# Patient Record
Sex: FEMALE | Race: WHITE
[De-identification: names, ages, dates, MRNs, and addresses within clinical notes are randomized per-mention and may not be internally consistent; named-entity substitution may affect disease eponyms.]

---

## 2018-03-31 ENCOUNTER — HOSPITAL ENCOUNTER (EMERGENCY)
Dept: HOSPITAL 17 - NEPD | Age: 53
LOS: 2 days | Discharge: HOME | End: 2018-04-02
Payer: COMMERCIAL

## 2018-03-31 VITALS
TEMPERATURE: 98.3 F | OXYGEN SATURATION: 98 % | DIASTOLIC BLOOD PRESSURE: 96 MMHG | RESPIRATION RATE: 18 BRPM | HEART RATE: 89 BPM | SYSTOLIC BLOOD PRESSURE: 167 MMHG

## 2018-03-31 VITALS — WEIGHT: 187.39 LBS | BODY MASS INDEX: 33.2 KG/M2 | HEIGHT: 63 IN

## 2018-03-31 DIAGNOSIS — G62.9: ICD-10-CM

## 2018-03-31 DIAGNOSIS — M79.7: ICD-10-CM

## 2018-03-31 DIAGNOSIS — I10: ICD-10-CM
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
## 2018-03-31 DIAGNOSIS — Z72.0: ICD-10-CM

## 2018-03-31 DIAGNOSIS — F10.14: Primary | ICD-10-CM

## 2018-03-31 DIAGNOSIS — F31.9: ICD-10-CM

## 2018-03-31 DIAGNOSIS — R45.851: ICD-10-CM

## 2018-03-31 DIAGNOSIS — Z91.5: ICD-10-CM

## 2018-03-31 LAB
ALBUMIN SERPL-MCNC: 4.3 GM/DL (ref 3.4–5)
ALP SERPL-CCNC: 62 U/L (ref 45–117)
ALT SERPL-CCNC: 24 U/L (ref 10–53)
APAP SERPL-MCNC: (no result) MCG/ML (ref 10–30)
AST SERPL-CCNC: 25 U/L (ref 15–37)
BASOPHILS # BLD AUTO: 0.1 TH/MM3 (ref 0–0.2)
BASOPHILS NFR BLD: 1.3 % (ref 0–2)
BILIRUB SERPL-MCNC: 0.4 MG/DL (ref 0.2–1)
BUN SERPL-MCNC: 14 MG/DL (ref 7–18)
CALCIUM SERPL-MCNC: 9 MG/DL (ref 8.5–10.1)
CHLORIDE SERPL-SCNC: 109 MEQ/L (ref 98–107)
CREAT SERPL-MCNC: 0.85 MG/DL (ref 0.5–1)
EOSINOPHIL # BLD: 0.2 TH/MM3 (ref 0–0.4)
EOSINOPHIL NFR BLD: 2.7 % (ref 0–4)
ERYTHROCYTE [DISTWIDTH] IN BLOOD BY AUTOMATED COUNT: 13 % (ref 11.6–17.2)
GFR SERPLBLD BASED ON 1.73 SQ M-ARVRAT: 71 ML/MIN (ref 89–?)
GLUCOSE SERPL-MCNC: 93 MG/DL (ref 74–106)
HCO3 BLD-SCNC: 19.5 MEQ/L (ref 21–32)
HCT VFR BLD CALC: 43.8 % (ref 35–46)
HGB BLD-MCNC: 15 GM/DL (ref 11.6–15.3)
LYMPHOCYTES # BLD AUTO: 3 TH/MM3 (ref 1–4.8)
LYMPHOCYTES NFR BLD AUTO: 46.5 % (ref 9–44)
MCH RBC QN AUTO: 32.3 PG (ref 27–34)
MCHC RBC AUTO-ENTMCNC: 34.2 % (ref 32–36)
MCV RBC AUTO: 94.4 FL (ref 80–100)
MONOCYTE #: 0.3 TH/MM3 (ref 0–0.9)
MONOCYTES NFR BLD: 5.1 % (ref 0–8)
NEUTROPHILS # BLD AUTO: 2.9 TH/MM3 (ref 1.8–7.7)
NEUTROPHILS NFR BLD AUTO: 44.4 % (ref 16–70)
PLATELET # BLD: 270 TH/MM3 (ref 150–450)
PMV BLD AUTO: 9.9 FL (ref 7–11)
PROT SERPL-MCNC: 8.3 GM/DL (ref 6.4–8.2)
RBC # BLD AUTO: 4.63 MIL/MM3 (ref 4–5.3)
SODIUM SERPL-SCNC: 140 MEQ/L (ref 136–145)
WBC # BLD AUTO: 6.5 TH/MM3 (ref 4–11)

## 2018-03-31 PROCEDURE — 99284 EMERGENCY DEPT VISIT MOD MDM: CPT

## 2018-03-31 PROCEDURE — 80307 DRUG TEST PRSMV CHEM ANLYZR: CPT

## 2018-03-31 PROCEDURE — 85025 COMPLETE CBC W/AUTO DIFF WBC: CPT

## 2018-03-31 PROCEDURE — 80053 COMPREHEN METABOLIC PANEL: CPT

## 2018-03-31 PROCEDURE — 80178 ASSAY OF LITHIUM: CPT

## 2018-03-31 PROCEDURE — 84443 ASSAY THYROID STIM HORMONE: CPT

## 2018-03-31 NOTE — PD
HPI


Chief Complaint:  Psychiatric Symptoms


Time Seen by Provider:  21:04


Travel History


International Travel<30 days:  No


Contact w/Intl Traveler<30days:  No


Traveled to known affect area:  No





History of Present Illness


HPI


52-year-old white female presents emergency department under a Baker act by PD.

  Patient has history of bipolar disorder, chronic neck and back pain, 

neuropathy and hypertension.  Patient states that she has been feeling 

increasingly depressed.  She is in the process of being weaned off her Xanax 

and lithium and she is starting Seroquel.  This is been going on for the past 

month.  She states that she has had thoughts of self-harm.  She also cuts.  She 

last cut earlier this week to her left lower leg.  She states that she feels 

suicidal but has no current plan.  She would like help.  She called police 

herself.  She denies any self-harm today.  No homicidal ideation.  She did not 

take her evening dose of blood pressure medicine.  She typically takes 100 mg 

of atenolol twice daily.





PFSH


Past Medical History


Bipolar Disorder:  Yes


Anxiety:  Yes


Depression:  Yes


Hypertension:  Yes


Medical other:  Yes (HIGH CALCIUM AND LOW VITAMIN D)


Neurologic:  Yes (NEUROPATHY FROM CHRONIC NECK AND BACK PAIN)


Tetanus Vaccination:  < 5 Years


Influenza Vaccination:  No


Pregnant?:  Not Pregnant


Menopausal:  Yes


Dilation and Curettage (D&C):  Yes (X2)


Tubal Ligation:  Yes





Past Surgical History


 Section:  Yes


Gynecologic Surgery:  Yes (RIGHT LUMPECTOMY X2 WITH DUCT REMOVAL, LAP RIGHT 

OVARIAN CYST REMOVAL)





Social History


Alcohol Use:  Yes (DAILY)


Tobacco Use:  Yes


Substance Use:  No





Allergies-Medications


(Allergen,Severity, Reaction):  


Coded Allergies:  


     dexamethasone (Unverified  Allergy, Severe, THROAT CLOSES UP, 17)


Reported Meds & Prescriptions





Reported Meds & Active Scripts


Active


Reported


Gabapentin 800 Mg Tab 800 Mg PO DAILY


Norco (Hydrocodone-Acetaminophen)  Mg Tab 1 Tab PO TID PRN


Lithium Carbonate 300 Mg Cap 300 Mg PO HS


Seroquel (Quetiapine Fumarate) 25 Mg Tab 25 Mg PO HS


Atenolol 100 Mg Tab 100 Mg PO BID








Review of Systems


Except as stated in HPI:  all other systems reviewed are Neg


General / Constitutional:  No: Fever


Eyes:  No: Visual changes


HENT:  No: Headaches


Cardiovascular:  No: Chest Pain or Discomfort


Respiratory:  No: Shortness of Breath


Gastrointestinal:  No: Abdominal Pain


Genitourinary:  No: Dysuria


Musculoskeletal:  No: Pain


Skin:  No Rash


Neurologic:  No: Weakness


Psychiatric:  Positive: Depression, Suicidal Ideations, Mood Disorder, No: 

Anxiety, Disorder of Thought, Substance Abuse, Homicidal Ideation


Endocrine:  No: Polydipsia


Hematologic/Lymphatic:  No: Easy Bruising





Physical Exam


Narrative


GENERAL: Well-nourished, well-developed patient.


SKIN: Warm and dry.  Patient has healing superficial cuts to her left proximal 

inner calf.  No signs of infection.  This is in the stages of healing.


HEAD: Normocephalic and atraumatic.


EYES: No scleral icterus. No injection or drainage. 


ENT: No nasal drainage noted. Mucous membranes pink. Airway patent.


NECK: Supple, trachea midline.  Moves head freely without obvious discomfort.


CARDIOVASCULAR: Regular rate and rhythm without murmurs, gallops, or rubs. 


RESPIRATORY: Breath sounds equal bilaterally. No accessory muscle use.


GASTROINTESTINAL: Abdomen soft, non-tender, nondistended. 


EXTREMITIES: No cyanosis or edema.


BACK: Nontender without obvious deformity. No CVA tenderness.


NEURO: Patient is alert and oriented. no sensorimotor deficits.  Nonfocal.  

Normal speech.


PSYCH: No delusions.  No auditory or visual hallucinations.





Data


Data


Last Documented VS





Vital Signs








  Date Time  Temp Pulse Resp B/P (MAP) Pulse Ox O2 Delivery O2 Flow Rate FiO2


 


3/31/18 20:53 98.3 89 18 167/96 (119) 98   








Orders





 Orders


Complete Blood Count With Diff (3/31/18 21:13)


Comprehensive Metabolic Panel (3/31/18 21:13)


Thyroid Stimulating Hormone (3/31/18 21:13)


Psych Screen (3/31/18 21:13)


Lithium (Li) (3/31/18 21:13)


Drug Screen, Random Urine (3/31/18 21:13)


Alcohol (Ethanol) (3/31/18 21:13)


Salicylates (Aspirin) (3/31/18 21:13)


Tylenol (Acetaminophen) (3/31/18 21:13)


Atenolol (Tenormin) (3/31/18 21:15)





Labs





Laboratory Tests








Test


  3/31/18


21:20


 


White Blood Count 6.5 TH/MM3 


 


Red Blood Count 4.63 MIL/MM3 


 


Hemoglobin 15.0 GM/DL 


 


Hematocrit 43.8 % 


 


Mean Corpuscular Volume 94.4 FL 


 


Mean Corpuscular Hemoglobin 32.3 PG 


 


Mean Corpuscular Hemoglobin


Concent 34.2 % 


 


 


Red Cell Distribution Width 13.0 % 


 


Platelet Count 270 TH/MM3 


 


Mean Platelet Volume 9.9 FL 


 


Neutrophils (%) (Auto) 44.4 % 


 


Lymphocytes (%) (Auto) 46.5 % 


 


Monocytes (%) (Auto) 5.1 % 


 


Eosinophils (%) (Auto) 2.7 % 


 


Basophils (%) (Auto) 1.3 % 


 


Neutrophils # (Auto) 2.9 TH/MM3 


 


Lymphocytes # (Auto) 3.0 TH/MM3 


 


Monocytes # (Auto) 0.3 TH/MM3 


 


Eosinophils # (Auto) 0.2 TH/MM3 


 


Basophils # (Auto) 0.1 TH/MM3 


 


CBC Comment DIFF FINAL 


 


Differential Comment  


 


Blood Urea Nitrogen 14 MG/DL 


 


Creatinine 0.85 MG/DL 


 


Random Glucose 93 MG/DL 


 


Total Protein 8.3 GM/DL 


 


Albumin 4.3 GM/DL 


 


Calcium Level 9.0 MG/DL 


 


Alkaline Phosphatase 62 U/L 


 


Aspartate Amino Transf


(AST/SGOT) 25 U/L 


 


 


Alanine Aminotransferase


(ALT/SGPT) 24 U/L 


 


 


Total Bilirubin 0.4 MG/DL 


 


Sodium Level 140 MEQ/L 


 


Potassium Level 3.9 MEQ/L 


 


Chloride Level 109 MEQ/L 


 


Carbon Dioxide Level 19.5 MEQ/L 


 


Anion Gap 12 MEQ/L 


 


Estimat Glomerular Filtration


Rate 71 ML/MIN 


 


 


Thyroid Stimulating Hormone


3rd Gen 1.080 uIU/ML 


 


 


Salicylates Level 5.0 MG/DL 


 


Urine Opiates Screen NEG 


 


Acetaminophen Level


  LESS THAN 2.0


MCG/ML


 


Urine Barbiturates Screen NEG 


 


Urine Amphetamines Screen NEG 


 


Urine Benzodiazepines Screen NEG 


 


Lithium Level 0.2 MEQ/L 


 


Urine Cocaine Screen NEG 


 


Urine Cannabinoids Screen NEG 


 


Ethyl Alcohol Level 216 MG/DL 











MDM


Medical Decision Making


Medical Screen Exam Complete:  Yes


Emergency Medical Condition:  Yes


Medical Record Reviewed:  Yes


Interpretation(s)





Laboratory Tests








Test


  3/31/18


21:20


 


White Blood Count 6.5 TH/MM3 


 


Red Blood Count 4.63 MIL/MM3 


 


Hemoglobin 15.0 GM/DL 


 


Hematocrit 43.8 % 


 


Mean Corpuscular Volume 94.4 FL 


 


Mean Corpuscular Hemoglobin 32.3 PG 


 


Mean Corpuscular Hemoglobin


Concent 34.2 % 


 


 


Red Cell Distribution Width 13.0 % 


 


Platelet Count 270 TH/MM3 


 


Mean Platelet Volume 9.9 FL 


 


Neutrophils (%) (Auto) 44.4 % 


 


Lymphocytes (%) (Auto) 46.5 % 


 


Monocytes (%) (Auto) 5.1 % 


 


Eosinophils (%) (Auto) 2.7 % 


 


Basophils (%) (Auto) 1.3 % 


 


Neutrophils # (Auto) 2.9 TH/MM3 


 


Lymphocytes # (Auto) 3.0 TH/MM3 


 


Monocytes # (Auto) 0.3 TH/MM3 


 


Eosinophils # (Auto) 0.2 TH/MM3 


 


Basophils # (Auto) 0.1 TH/MM3 


 


CBC Comment DIFF FINAL 


 


Differential Comment  


 


Blood Urea Nitrogen 14 MG/DL 


 


Creatinine 0.85 MG/DL 


 


Random Glucose 93 MG/DL 


 


Total Protein 8.3 GM/DL 


 


Albumin 4.3 GM/DL 


 


Calcium Level 9.0 MG/DL 


 


Alkaline Phosphatase 62 U/L 


 


Aspartate Amino Transf


(AST/SGOT) 25 U/L 


 


 


Alanine Aminotransferase


(ALT/SGPT) 24 U/L 


 


 


Total Bilirubin 0.4 MG/DL 


 


Sodium Level 140 MEQ/L 


 


Potassium Level 3.9 MEQ/L 


 


Chloride Level 109 MEQ/L 


 


Carbon Dioxide Level 19.5 MEQ/L 


 


Anion Gap 12 MEQ/L 


 


Estimat Glomerular Filtration


Rate 71 ML/MIN 


 


 


Thyroid Stimulating Hormone


3rd Gen 1.080 uIU/ML 


 


 


Salicylates Level 5.0 MG/DL 


 


Urine Opiates Screen NEG 


 


Acetaminophen Level


  LESS THAN 2.0


MCG/ML


 


Urine Barbiturates Screen NEG 


 


Urine Amphetamines Screen NEG 


 


Urine Benzodiazepines Screen NEG 


 


Lithium Level 0.2 MEQ/L 


 


Urine Cocaine Screen NEG 


 


Urine Cannabinoids Screen NEG 


 


Ethyl Alcohol Level 216 MG/DL 








Differential Diagnosis


MDM: High


Differential diagnoses: Schizophrenia, schizoaffective disorder, bipolar, 

anxiety, depression, adjustment reaction, mood disorder NOS, ODD, depressive 

disorder NOS, dementia, dementia with agitation, psychosis NOS, substance 

induced mood disorder, DMDD, Asperger syndrome, infection,electrolyte 

abnormality, malingering.


Narrative Course


Mental health screening discussed with the patient.  Psychiatric screen 

ordered.  Patient is given her atenolol 100 mg p.o. here.





The patient is now requesting the rest of her eating medications.  She does 

have her bottles containing all of her medications.  The patient is allowed to 

take her evening medications.  This will be documented in the nursing note.





The patient has been medically cleared.





This is medical clearance for psychiatric admission, bipolar, cutting





Diagnosis





 Primary Impression:  


 Medical clearance for psychiatric admission


 Additional Impressions:  


 Bipolar


 Cutting


Condition:  Stable











Chetan Haywood Mar 31, 2018 21:20

## 2018-04-01 VITALS
HEART RATE: 80 BPM | DIASTOLIC BLOOD PRESSURE: 75 MMHG | RESPIRATION RATE: 16 BRPM | SYSTOLIC BLOOD PRESSURE: 125 MMHG | OXYGEN SATURATION: 97 %

## 2018-04-01 VITALS
OXYGEN SATURATION: 98 % | RESPIRATION RATE: 19 BRPM | SYSTOLIC BLOOD PRESSURE: 144 MMHG | HEART RATE: 66 BPM | DIASTOLIC BLOOD PRESSURE: 66 MMHG

## 2018-04-02 VITALS
DIASTOLIC BLOOD PRESSURE: 88 MMHG | OXYGEN SATURATION: 98 % | HEART RATE: 90 BPM | SYSTOLIC BLOOD PRESSURE: 162 MMHG | RESPIRATION RATE: 16 BRPM

## 2018-04-02 VITALS
DIASTOLIC BLOOD PRESSURE: 90 MMHG | HEART RATE: 83 BPM | TEMPERATURE: 98.5 F | OXYGEN SATURATION: 98 % | SYSTOLIC BLOOD PRESSURE: 171 MMHG | RESPIRATION RATE: 17 BRPM

## 2018-04-02 VITALS — DIASTOLIC BLOOD PRESSURE: 90 MMHG | SYSTOLIC BLOOD PRESSURE: 171 MMHG | TEMPERATURE: 98.5 F
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
## 2018-04-02 NOTE — PD
Physical Exam


Date Seen by Provider:  Apr 2, 2018


Time Seen by Provider:  07:49


Narrative


52-year-old female previously brought in under the Baker act and medically 

cleared for psychiatric evaluation, has been seen by the psychiatrist and 

deemed psychiatrically stable for discharge at this time.  Patient remains 

medically stable for discharge.  Follow-up will be based on the psychiatric 

note.





Data


Data


Last Documented VS





Vital Signs








  Date Time  Temp Pulse Resp B/P (MAP) Pulse Ox O2 Delivery O2 Flow Rate FiO2


 


4/2/18 02:47  90 16 162/88 (112) 98 Room Air  


 


3/31/18 20:53 98.3       








Orders





 Orders


Complete Blood Count With Diff (3/31/18 21:13)


Comprehensive Metabolic Panel (3/31/18 21:13)


Thyroid Stimulating Hormone (3/31/18 21:13)


Psych Screen (3/31/18 21:13)


Lithium (Li) (3/31/18 21:13)


Drug Screen, Random Urine (3/31/18 21:13)


Alcohol (Ethanol) (3/31/18 21:13)


Salicylates (Aspirin) (3/31/18 21:13)


Tylenol (Acetaminophen) (3/31/18 21:13)


Atenolol (Tenormin) (3/31/18 21:15)


Diet Regular Basic (4/1/18 Breakfast)


Atenolol (Tenormin) (4/2/18 03:00)


Diet Regular Basic (4/2/18 Breakfast)





Labs





Laboratory Tests








Test


  3/31/18


21:20


 


White Blood Count 6.5 TH/MM3 


 


Red Blood Count 4.63 MIL/MM3 


 


Hemoglobin 15.0 GM/DL 


 


Hematocrit 43.8 % 


 


Mean Corpuscular Volume 94.4 FL 


 


Mean Corpuscular Hemoglobin 32.3 PG 


 


Mean Corpuscular Hemoglobin


Concent 34.2 % 


 


 


Red Cell Distribution Width 13.0 % 


 


Platelet Count 270 TH/MM3 


 


Mean Platelet Volume 9.9 FL 


 


Neutrophils (%) (Auto) 44.4 % 


 


Lymphocytes (%) (Auto) 46.5 % 


 


Monocytes (%) (Auto) 5.1 % 


 


Eosinophils (%) (Auto) 2.7 % 


 


Basophils (%) (Auto) 1.3 % 


 


Neutrophils # (Auto) 2.9 TH/MM3 


 


Lymphocytes # (Auto) 3.0 TH/MM3 


 


Monocytes # (Auto) 0.3 TH/MM3 


 


Eosinophils # (Auto) 0.2 TH/MM3 


 


Basophils # (Auto) 0.1 TH/MM3 


 


CBC Comment DIFF FINAL 


 


Differential Comment  


 


Blood Urea Nitrogen 14 MG/DL 


 


Creatinine 0.85 MG/DL 


 


Random Glucose 93 MG/DL 


 


Total Protein 8.3 GM/DL 


 


Albumin 4.3 GM/DL 


 


Calcium Level 9.0 MG/DL 


 


Alkaline Phosphatase 62 U/L 


 


Aspartate Amino Transf


(AST/SGOT) 25 U/L 


 


 


Alanine Aminotransferase


(ALT/SGPT) 24 U/L 


 


 


Total Bilirubin 0.4 MG/DL 


 


Sodium Level 140 MEQ/L 


 


Potassium Level 3.9 MEQ/L 


 


Chloride Level 109 MEQ/L 


 


Carbon Dioxide Level 19.5 MEQ/L 


 


Anion Gap 12 MEQ/L 


 


Estimat Glomerular Filtration


Rate 71 ML/MIN 


 


 


Thyroid Stimulating Hormone


3rd Gen 1.080 uIU/ML 


 


 


Salicylates Level 5.0 MG/DL 


 


Urine Opiates Screen NEG 


 


Acetaminophen Level


  LESS THAN 2.0


MCG/ML


 


Urine Barbiturates Screen NEG 


 


Urine Amphetamines Screen NEG 


 


Urine Benzodiazepines Screen NEG 


 


Lithium Level 0.2 MEQ/L 


 


Urine Cocaine Screen NEG 


 


Urine Cannabinoids Screen NEG 


 


Ethyl Alcohol Level 216 MG/DL 











[facility]


Medical Record Reviewed:  Yes


Supervised Visit with LIZETT:  Yes


Narrative Course


52-year-old female previously brought in under the Baker act and medically 

cleared for psychiatric evaluation, has been seen by the psychiatrist and 

deemed psychiatrically stable for discharge at this time.  Patient remains 

medically stable for discharge.  Follow-up will be based on the psychiatric 

note.


Diagnosis





 Primary Impression:  


 Medical clearance for psychiatric admission


 Additional Impressions:  


 Bipolar


 Cutting


Referrals:  


Primary Care Physician





Psychiatrist


Patient Instructions:  General Instructions


Disposition:  01 DISCHARGE HOME


Condition:  Stable











Oneil Perez Apr 2, 2018 07:51

## 2018-06-19 ENCOUNTER — HOSPITAL ENCOUNTER (EMERGENCY)
Dept: HOSPITAL 17 - NEPJ | Age: 53
LOS: 1 days | Discharge: HOME | End: 2018-06-20
Payer: COMMERCIAL

## 2018-06-19 VITALS — DIASTOLIC BLOOD PRESSURE: 94 MMHG | HEART RATE: 82 BPM | SYSTOLIC BLOOD PRESSURE: 168 MMHG

## 2018-06-19 VITALS — WEIGHT: 185.19 LBS | BODY MASS INDEX: 32.81 KG/M2 | HEIGHT: 63 IN

## 2018-06-19 VITALS
TEMPERATURE: 97.8 F | SYSTOLIC BLOOD PRESSURE: 194 MMHG | HEART RATE: 72 BPM | DIASTOLIC BLOOD PRESSURE: 102 MMHG | RESPIRATION RATE: 20 BRPM | OXYGEN SATURATION: 98 %

## 2018-06-19 VITALS — SYSTOLIC BLOOD PRESSURE: 158 MMHG | RESPIRATION RATE: 12 BRPM | HEART RATE: 78 BPM | DIASTOLIC BLOOD PRESSURE: 90 MMHG

## 2018-06-19 DIAGNOSIS — F10.14: ICD-10-CM

## 2018-06-19 DIAGNOSIS — G62.9: ICD-10-CM

## 2018-06-19 DIAGNOSIS — Z88.8: ICD-10-CM

## 2018-06-19 DIAGNOSIS — Z72.0: ICD-10-CM

## 2018-06-19 DIAGNOSIS — M54.9: ICD-10-CM

## 2018-06-19 DIAGNOSIS — I10: ICD-10-CM

## 2018-06-19 DIAGNOSIS — Z79.899: ICD-10-CM

## 2018-06-19 DIAGNOSIS — F41.9: ICD-10-CM

## 2018-06-19 DIAGNOSIS — R51: ICD-10-CM

## 2018-06-19 DIAGNOSIS — R45.851: Primary | ICD-10-CM

## 2018-06-19 DIAGNOSIS — F31.9: ICD-10-CM

## 2018-06-19 PROCEDURE — 99284 EMERGENCY DEPT VISIT MOD MDM: CPT

## 2018-06-19 RX ADMIN — ATENOLOL SCH MG: 100 TABLET ORAL at 20:22

## 2018-06-19 NOTE — PD
HPI


Chief Complaint:  Psychiatric Symptoms


Time Seen by Provider:  18:12


Travel History


International Travel<30 days:  No


Contact w/Intl Traveler<30days:  No


Traveled to known affect area:  No





History of Present Illness


HPI


52-year-old female presents to the emergency department under Baker act.  She 

was sent here from [facility], after being medically cleared, 

for psychological evaluation.  According to medical records from [facility] the patient took 50, 100 mg atenolol in an attempt to kill herself on 

.  The patient admits to taking the medications.  She says she was drunk 

at the time and has been drinking daily, and her action was exacerbated by her 

alcohol use.  She denies suicidal ideation at this time.  She has history of 

suicidal attempt by overdosing on pills in the past.  She denies homicidal 

ideations.  Denies auditory or visual hallucinations.  Denies illicit drug use.

  Aggravated by alcohol use.  No known relieving factors.  Symptoms are 

moderate to severe in severity.  Duration chronic.  She is complaining of a 

headache that has been on and off.  She denies chest pain, shortness of breath, 

abdominal pain, nausea, vomiting, change in urine or stool.  She is complaining 

of left upper back pain also.  Denies anticoagulant therapy.  Primary care 

provider is Dr. Rhodes.  Allergies to dexamethasone.  History of bipolar 

disorder and hypertension.  Has no other medical complaints.  No other 

modifying factors or associated signs and symptoms.





PFSH


Past Medical History


Bipolar Disorder:  Yes


Anxiety:  Yes


Depression:  Yes


Patient Takes Glucophage:  No


Hypertension:  Yes


Neurologic:  Yes (NEUROPATHY FROM CHRONIC NECK AND BACK PAIN)


Reproductive:  Yes


Pregnant?:  Not Pregnant


Menopausal:  Yes


:  3


Para:  2


Miscarriage:  0


:  1


Dilation and Curettage (D&C):  Yes (X2)


Tubal Ligation:  Yes





Past Surgical History


 Section:  Yes


Gynecologic Surgery:  Yes (RIGHT LUMPECTOMY X2 WITH DUCT REMOVAL, LAP RIGHT 

OVARIAN CYST REMOVAL)





Social History


Alcohol Use:  Yes (DAILY)


Tobacco Use:  Yes


Substance Use:  No





Allergies-Medications


(Allergen,Severity, Reaction):  


Coded Allergies:  


     dexamethasone (Unverified  Allergy, Severe, THROAT CLOSES UP, 18)


 Per pt.


Reported Meds & Prescriptions





Reported Meds & Active Scripts


Active


Reported


Visine Opth Drops (Tetrahydrozoline Opth Drops) 0.05 % Sol 1 Drop EACH EYE QID


Omega 3-6-9 1,200 mg Softgel (Fish Oil/Borage/Flax/Om3,6,9#1) 1,200 Mg Capsule 

  


Lisinopril 20 Mg Tab 20 Mg PO DAILY


Clonidine (Clonidine HCl) 0.2 Mg Tab 0.2 Mg PO BID PRN


Vitamin D3 (Cholecalciferol) 400 Unit Tab 400 Units PO DAILY


Combivent Respimat Inh (Ipratropium-Albuterol Inh)  [facility]/Act Aero 1 Puff 

INH QID PRN


Gabapentin 800 Mg Tab 800 Mg PO DAILY


Seroquel (Quetiapine Fumarate) 25 Mg Tab 25 Mg PO HS


Atenolol 100 Mg Tab 100 Mg PO BID








Review of Systems


Except as stated in HPI:  all other systems reviewed are Neg





Physical Exam


Narrative


GENERAL: Well-nourished, well-developed  feet patient, in no acute 

distress


SKIN: Warm and dry.  Skin to left upper back is normal in appearance.


HEAD: Atraumatic. Normocephalic. 


EYES: Pupils equal and round.


ENT: Mucosa pink and moist.


NECK: Supple.  Trachea midline.  


CARDIOVASCULAR: Regular rate and rhythm.  No murmur appreciated. 


RESPIRATORY: No accessory muscle use. Clear to auscultation. Breath sounds 

equal bilaterally. 


GASTROINTESTINAL: Abdomen soft, non-tender, nondistended. Hepatic and splenic 

margins not palpable.  Bowel sounds are active 4 quadrants.  


MUSCULOSKELETAL: No obvious deformities. No clubbing.  No cyanosis.  No edema. 


BACK: No CVA tenderness.  Reproducible tenderness to the left upper trapezius 

musculature of the back.


NEUROLOGICAL: Awake and alert.  Oriented 3.  No obvious cranial nerve 

deficits.  Motor grossly within normal limits. Normal speech.  Moves all 

extremities.  5/5 strength to all extremities.


PSYCHIATRIC: No delusional thought processes. No hallucinations.





Data


Data


Orders





 Orders


Diet Regular Basic (18 Dinner)


Ibuprofen (Motrin) (18 18:15)








MDM


Medical Decision Making


Medical Screen Exam Complete:  Yes


Emergency Medical Condition:  Yes


Medical Record Reviewed:  Yes


Differential Diagnosis


Suicidal attempt, overdose, medical clearance for psychiatric admission


Narrative Course


Patient presents under a Smart act.  She is transferred from [facility] [place] after medical clearance.  I reviewed her medical records from [facility] and her labs, urinalysis unremarkable.  Physical examination and vital 

signs are essentially unremarkable.  Patient complaining of headache and left 

upper back pain.  Ibuprofen ordered.





Psych screen has been ordered.





laboratory results are unremarkable, the patient will be medically cleared for 

psychiatric evaluation and disposition.





Diagnosis





 Primary Impression:  


 Medical clearance for psychiatric admission


 Additional Impression:  


 Suicide attempt


Condition:  Stable











Ariana Fountain 2018 18:31

## 2018-06-20 VITALS
RESPIRATION RATE: 16 BRPM | OXYGEN SATURATION: 99 % | DIASTOLIC BLOOD PRESSURE: 98 MMHG | SYSTOLIC BLOOD PRESSURE: 168 MMHG | HEART RATE: 75 BPM

## 2018-06-20 VITALS — SYSTOLIC BLOOD PRESSURE: 172 MMHG | HEART RATE: 76 BPM | DIASTOLIC BLOOD PRESSURE: 96 MMHG

## 2018-06-20 RX ADMIN — ATENOLOL SCH MG: 100 TABLET ORAL at 09:00

## 2018-06-20 NOTE — PD.PSY.CON
Provisional Diagnosis


Admission Date





Axis I.


Polysubstance dependence including alcohol, cocaine, Xanax, history of bipolar 

disorder


Axis II.


Borderline personality disorder


Axis III.


Lower back pain





History of Present Illness


Service


Psychiatry


Consult Requested By


Suicidal ideation


Reason for Consult


Suicidal ideation


Primary Care Physician


Unknown


HPI


The patient was seen this morning at 8:30 AM





The patient is 52-year-old  woman, domiciled with her  in 

[place], unemployed, supported by [facility], with psychiatric history of bipolar 

disorder, polysubstance dependence including Xanax, cocaine, alcohol, opiates, 

with an extensive history of multiple psychiatric hospitalizations, self 

cutting behavior, multiple suicidal attempts, she is not in psychotropics, 

medical history of back pain, who presents to the emergency department under 

Baker act.  She was sent here from [facility] ER, after being 

medically cleared, for psychiatric evaluation.   According to medical records 

from [facility] the patient took 50, 100 mg atenolol in an attempt to 

kill herself on June 17.  The patient admits to taking the medications.  She 

says she was drunk at the time and has been drinking daily, and her action was 

exacerbated by her alcohol use.  She denies suicidal ideation at this time.  

She has history of suicidal attempt by overdosing on pills in the past.  She 

denies homicidal ideations.  Denies auditory or visual hallucinations.  Denies 

illicit drug use.  Aggravated by alcohol use.  No known relieving factors.  

Symptoms are moderate to severe in severity.  Duration  that has been on 

and off.  She denies chest pain, shortness of breath, abdominal pain, nausea, 

vomiting, change in urine or stool.  She is complaining of left upper back pain 

also.  Denies anticoagulant therapy.  Primary care provider is Dr. Rhodes.  

Allergies to dexamethasone.  On psychiatric evaluation today the patient is calm

, cooperative.  She reports that last night she was quite drunk when she took 

pills impulsively.  The patient reports that her intention was not to kill 

herself, she was just drunk and crazy.  The patient reports that she understand 

that she is a drug addict and she needs help, her major problem right now is 

the alcohol abuse.  She is open to look for help.  She denies suicidal and 

homicidal ideation, she denies visual and auditory hallucinations.  Denies 

depression, anxiety, symptoms of withdrawal, denies psychosis.  She is logical, 

coherent and relevant.  Oriented 3.





Review of Systems


Constitutional:  DENIES: Diaphoretic episodes, Fatigue, Fever, Weight gain, 

Weight loss, Chills, Dizziness, Change in appetite, Night Sweats


Endocrine:  DENIES: Abnorml menstrual pattern, Heat/cold intolerance, Polydipsia

, Polyuria, Polyphagia


Eyes:  DENIES: Blurred vision, Diplopia, Eye inflammation, Eye pain, Vision loss

, Photosensitivity, Double Vision


Ears, nose, mouth, throat:  DENIES: Tinnitus, Hearing loss, Vertigo, Nasal 

discharge, Oral lesions, Throat pain, Hoarseness, Ear Pain, Running Nose, 

Epistaxis, Sinus Pain, Toothache, Odynophagia


Respiratory:  DENIES: Apneas, Cough, Snoring, Wheezing, Hemoptysis, Sputum 

production, Shortness of breath


Cardiovascular:  DENIES: Chest pain, Palpitations, Syncope, Dyspnea on Exertion

, PND, Lower Extremity Edema, Orthopnea, Claudication


Gastrointestinal:  DENIES: Abdominal pain, Black stools, Bloody stools, 

Constipation, Diarrhea, Nausea, Vomiting, Difficulty Swallowing, Anorexia


Genitourinary:  DENIES: Abnormal vaginal bleeding, Dysmenorrhea, Dyspareunia, 

Sexual dysfunction, Urinary frequency, Urinary incontinence, Urgency, Hematuria

, Dysuria, Nocturia, Vaginal discharge


Musculoskeletal:  DENIES: Joint pain, Muscle aches, Stiffness, Joint Swelling, 

Back pain, Neck pain


Integumentary:  DENIES: Abnormal pigmentation, Pruritus, Rash, Nail changes, 

Breast masses, Breast skin changes, Nipple discharge


Hematologic/lymphatic:  DENIES: Bruising, Lymphadenopathy


Immunologic/allergic:  DENIES: Eczema, Urticaria


Neurologic:  DENIES: Abnormal gait, Headache, Localized weakness, Paresthesias, 

Seizures, Speech Problems, Tremor, Poor Balance


Psychiatric:  DENIES: Anxiety, Confusion, Mood changes, Depression, 

Hallucinations, Agitation, Suicidal Ideation, Homicidal Ideation, Delusions





Past Family Social History


Coded Allergies:  


     dexamethasone (Unverified  Allergy, Severe, THROAT CLOSES UP, 6/19/18)


 Per pt.


Reported Medications


Tetrahydrozoline Opth Drops (Visine Opth Drops) 0.05 % Sol, 1 DROP EACH EYE QID

, #1 BOTTLE 0 Refills


   6/19/18


Fish Oil/Borage/Flax/Om3,6,9#1 (Omega 3-6-9 1,200 mg Softgel) 1,200 Mg Capsule


   6/19/18


Lisinopril (Lisinopril) 20 Mg Tab, 20 MG PO DAILY, #30 TAB 0 Refills


   6/19/18


Clonidine (Clonidine) 0.2 Mg Tab, 0.2 MG PO BID Y for BP>160/80, #60 TAB 0 

Refills


   6/19/18


Cholecalciferol (Vitamin D3) 400 Unit Tab, 400 UNITS PO DAILY for Nutritional 

Supplement, #1 TAB 0 Refills


   6/19/18


Ipratropium-Albuterol Inh (Combivent Respimat Inh)  [facility]/Act Aero, 1 PUFF 

INH QID Y for SOB/WHEEZING, #1 INHALER 0 Refills


   6/19/18


Gabapentin (Gabapentin) 800 Mg Tab, 800 MG PO DAILY, #90 TAB 0 Refills


   3/31/18


Quetiapine (Seroquel) 25 Mg Tab, 25 MG PO HS, #30 TAB 0 Refills


   3/31/18


Atenolol (Atenolol) 100 Mg Tab, 100 MG PO BID for Blood Pressure Management, #

60 TAB 0 Refills


   3/31/18


Discontinued Reported Medications


Hydrocodone-Acetaminophen (Norco)  Mg Tab, 1 TAB PO TID Y for PAIN, TAB 0 

Refills


   3/31/18


Lithium Carbonate (Lithium Carbonate) 300 Mg Cap, 300 MG PO HS, CAP 0 Refills


   3/31/18


Family Psych History


No family psychiatric history


Social History


The patient was born and raised in [place], she losing [place] with her 

, no kids, unemployed, supported by [employer], her highest level of education 

is some college


Patient's Strengths (min. 2)


Verbal communication





Physical Exam


No tremors, no EPS, no psychomotor agitation retardation


Vital Signs





Vital Signs








  Date Time  Temp Pulse Resp B/P (MAP) Pulse Ox O2 Delivery O2 Flow Rate FiO2


 


6/20/18 10:24        


 


6/20/18 06:39  76      


 


6/20/18 02:00   16  99 Room Air  


 


6/19/18 18:41 97.8       











Mental Status Examination


Appearance:  Appropriate


Consciousness:  Alert


Orientation:  x4


Motor Activity:  Normal gait


Speech:  Unremarkable


Language:  Adequate


Fund of Knowledge:  Adequate


Attention and Concentration:  Adequate


Memory:  Unremarkable


Mood:  Appropriate


Affect:  Appropriate


Thought Process & Associations:  Intact


Thought Content:  Appropriate


Hallucination Type:  None


Delusion Type:  None


Suicidal Ideation:  No


Suicidal Plan:  No


Suicidal Intention:  No


Homicidal Ideation:  No


Homicidal Plan:  No


Homicidal Intention:  No


Insight:  Adequate


Judgment:  Adequate





Assessment & Plan


Problem List:  


(1) Alcohol abuse with alcohol-induced mood disorder


ICD Codes:  F10.14 - Alcohol abuse with alcohol-induced mood disorder


Assessment & Plan:  On psychiatric evaluation today the patient does not 

present any significant, acute or concerning objective or subjective 

symptomatology of depression, anxiety, marisa or psychosis.  The patient denies 

suicidal enemas ideation, she denies visual and auditory hallucinations.  The 

patient is oriented 3, calm, cooperative, logical, coherent and relevant.  

This is a patient with a prominent psychiatric history of bipolar disorder, 

polysubstance dependence including alcohol, cocaine, Xanax, opiates, multiple 

psychiatric hospitalizations, multiple suicidal attempts, extensive self 

cutting behavior without SI.  Her recent suicidal attempt by overdosing with 

"some hypertensive pills" seems to be the result of acute alcohol intoxication, 

and also maladaptive use of medication, impulsive behavior related with her 

clinically significant cluster B traits.  I really suspect that this patient 

may very well the criteria of borderline personality disorder.  At this moment 

she does not meet the criteria for involuntary psychiatric admission.  She 

seems to have some insight and seems to be in a contemplation state of her drug 

addiction, for this reason I have spent some time with her given her some 

motivation to engage in an outpatient program of detox/rehab.  Several 

resources referral has been provided to the patient.  Smart act will be lifted





Assessment & Plan


Estimated LOS:  Conrado Kimble MD Jun 20, 2018 16:08

## 2018-06-20 NOTE — PD
Data


Data


Last Documented VS





Vital Signs








  Date Time  Temp Pulse Resp B/P (MAP) Pulse Ox O2 Delivery O2 Flow Rate FiO2


 


6/20/18 06:39  76      





    172/96 (121)    


 


6/20/18 02:00   16  99 Room Air  


 


6/19/18 18:41 97.8       








Orders





 Orders


Diet Regular Basic (6/19/18 Dinner)


Ibuprofen (Motrin) (6/19/18 18:15)


Atenolol (Tenormin) (6/19/18 21:00)


Clonidine (Catapres) (6/20/18 00:15)


Diet Regular Basic (6/20/18 Breakfast)


Ed Discharge Order (6/20/18 09:28)








MDM


Medical Record Reviewed:  Yes


Supervised Visit with LIZETT:  Yes


Narrative Course


Patient seen and evaluated by Dr. Stockton with psychiatry, she was deemed 

safe to be discharged home, Smart act was lifted by Dr. Stockton.  Patient was 

given outpatient follow-up.


DX: Alcohol induced mood disorder


Diagnosis





 Primary Impression:  


 Medical clearance for psychiatric admission


 Additional Impressions:  


 Suicide attempt


 Alcohol abuse with alcohol-induced mood disorder


Referrals:  


[facility]


Patient Instructions:  General Instructions





***Additional Instruction:  








Please follow up with your primary care doctor in 2-3 days





Return to the ER if symptoms worsen or progress





Return to the ER as needed


Disposition:  01 DISCHARGE HOME


Condition:  Stable











Lenora Beaver DO Jun 20, 2018 09:30